# Patient Record
Sex: MALE | URBAN - METROPOLITAN AREA
[De-identification: names, ages, dates, MRNs, and addresses within clinical notes are randomized per-mention and may not be internally consistent; named-entity substitution may affect disease eponyms.]

---

## 2022-09-27 ENCOUNTER — APPOINTMENT (RX ONLY)
Dept: URBAN - METROPOLITAN AREA CLINIC 158 | Facility: CLINIC | Age: 42
Setting detail: DERMATOLOGY
End: 2022-09-27

## 2022-09-27 DIAGNOSIS — L40.0 PSORIASIS VULGARIS: ICD-10-CM

## 2022-09-27 PROCEDURE — ? PRESCRIPTION

## 2022-09-27 PROCEDURE — ? ORDER TESTS

## 2022-09-27 PROCEDURE — ? EDUCATIONAL RESOURCES PROVIDED

## 2022-09-27 PROCEDURE — 99204 OFFICE O/P NEW MOD 45 MIN: CPT

## 2022-09-27 PROCEDURE — ? COUNSELING

## 2022-09-27 PROCEDURE — ? PATIENT SPECIFIC COUNSELING

## 2022-09-27 RX ORDER — CLOBETASOL PROPIONATE 0.5 MG/G
1 CREAM TOPICAL BID
Qty: 45 | Refills: 6 | Status: ERX | COMMUNITY
Start: 2022-09-27

## 2022-09-27 RX ADMIN — CLOBETASOL PROPIONATE 1: 0.5 CREAM TOPICAL at 00:00

## 2022-09-27 ASSESSMENT — LOCATION SIMPLE DESCRIPTION DERM
LOCATION SIMPLE: LEFT KNEE
LOCATION SIMPLE: RIGHT PRETIBIAL REGION
LOCATION SIMPLE: RIGHT FOREARM
LOCATION SIMPLE: RIGHT KNEE
LOCATION SIMPLE: LEFT PRETIBIAL REGION
LOCATION SIMPLE: LEFT FOREARM

## 2022-09-27 ASSESSMENT — LOCATION DETAILED DESCRIPTION DERM
LOCATION DETAILED: RIGHT PROXIMAL DORSAL FOREARM
LOCATION DETAILED: LEFT KNEE
LOCATION DETAILED: RIGHT LATERAL DISTAL PRETIBIAL REGION
LOCATION DETAILED: LEFT DISTAL PRETIBIAL REGION
LOCATION DETAILED: LEFT PROXIMAL DORSAL FOREARM
LOCATION DETAILED: RIGHT KNEE

## 2022-09-27 ASSESSMENT — LOCATION ZONE DERM
LOCATION ZONE: ARM
LOCATION ZONE: LEG

## 2022-09-27 ASSESSMENT — BSA PSORIASIS: % BODY COVERED IN PSORIASIS: 15

## 2022-09-27 NOTE — PROCEDURE: PATIENT SPECIFIC COUNSELING
The patient has a long-standing history of psoriasis.  He reports that he was treated with Enbrel 50mg weekly for 10 years while he was living in [place]. He reports that Enbel did control his psoriasis (but did not completely clear him). Prior to being on Enbrel, he had plaques of psoriasis over the scalp, trunk, arms, and legs.  He has been off Enbrel for 2 years ever since he moved here to [place].  He reports that his psoriasis is flaring over the past year without Enbrel. He is currently using a prescription cream (he cannot recall the name of the medication) but it has not helped. Exam shows active psoriasis plaques on the arms and legs. There is approximately 15% BSA involved.  Patient wants to go back on a biologic. I discussed treatment options with the biologic and recommend that we try to get him approve for Skyrizi as this medication has better efficacy than Enbrel and is only dosed 4 times a year after the initial loading dose. I will submit Skyrizi complete enrollment form to [facility] specialty pharmacy and to Skyrizi complete. I will start him on clobetasol cream topically. Printed order given for blood test.  Return to clinic TBD based upon approval of Skyrizi.
Detail Level: Detailed

## 2022-10-31 ENCOUNTER — APPOINTMENT (RX ONLY)
Dept: URBAN - METROPOLITAN AREA CLINIC 158 | Facility: CLINIC | Age: 42
Setting detail: DERMATOLOGY
End: 2022-10-31

## 2022-10-31 DIAGNOSIS — L40.0 PSORIASIS VULGARIS: ICD-10-CM | Status: INADEQUATELY CONTROLLED

## 2022-10-31 PROCEDURE — ? COUNSELING

## 2022-10-31 PROCEDURE — ? EDUCATIONAL RESOURCES PROVIDED

## 2022-10-31 PROCEDURE — 99214 OFFICE O/P EST MOD 30 MIN: CPT

## 2022-10-31 PROCEDURE — ? PRESCRIPTION

## 2022-10-31 PROCEDURE — ? ORDER TESTS

## 2022-10-31 PROCEDURE — ? PATIENT SPECIFIC COUNSELING

## 2022-10-31 RX ORDER — METHOTREXATE SODIUM 2.5 MG/1
4 TABLET ORAL WEEKLY
Qty: 16 | Refills: 0 | Status: ERX | COMMUNITY
Start: 2022-10-31

## 2022-10-31 RX ORDER — FOLIC ACID 1 MG/1
1 TABLET ORAL QD
Qty: 30 | Refills: 3 | Status: ERX | COMMUNITY
Start: 2022-10-31

## 2022-10-31 RX ADMIN — FOLIC ACID 1: 1 TABLET ORAL at 00:00

## 2022-10-31 RX ADMIN — METHOTREXATE SODIUM 4: 2.5 TABLET ORAL at 00:00

## 2022-10-31 ASSESSMENT — LOCATION SIMPLE DESCRIPTION DERM
LOCATION SIMPLE: LEFT FOREARM
LOCATION SIMPLE: RIGHT FOREARM
LOCATION SIMPLE: RIGHT UPPER ARM
LOCATION SIMPLE: RIGHT PRETIBIAL REGION
LOCATION SIMPLE: RIGHT KNEE
LOCATION SIMPLE: LEFT PRETIBIAL REGION
LOCATION SIMPLE: LEFT KNEE
LOCATION SIMPLE: LEFT UPPER ARM

## 2022-10-31 ASSESSMENT — BSA PSORIASIS: % BODY COVERED IN PSORIASIS: 15

## 2022-10-31 ASSESSMENT — LOCATION DETAILED DESCRIPTION DERM
LOCATION DETAILED: RIGHT PROXIMAL LATERAL POSTERIOR UPPER ARM
LOCATION DETAILED: LEFT KNEE
LOCATION DETAILED: RIGHT KNEE
LOCATION DETAILED: LEFT PROXIMAL DORSAL FOREARM
LOCATION DETAILED: LEFT PROXIMAL POSTERIOR UPPER ARM
LOCATION DETAILED: LEFT DISTAL PRETIBIAL REGION
LOCATION DETAILED: RIGHT PROXIMAL DORSAL FOREARM
LOCATION DETAILED: RIGHT LATERAL DISTAL PRETIBIAL REGION

## 2022-10-31 ASSESSMENT — LOCATION ZONE DERM
LOCATION ZONE: LEG
LOCATION ZONE: ARM

## 2022-10-31 ASSESSMENT — PGA PSORIASIS: PGA PSORIASIS 2020: MODERATE

## 2022-10-31 NOTE — PROCEDURE: PATIENT SPECIFIC COUNSELING
Carry forward from 9/27/2022: The patient has a long-standing history of psoriasis.  He reports that he was treated with Enbrel 50mg weekly for 10 years while he was living in [place]. He reports that Enbel did control his psoriasis (but did not completely clear him). Prior to being on Enbrel, he had plaques of psoriasis over the scalp, trunk, arms, and legs.  He has been off Enbrel for 2 years ever since he moved here to [place].  He reports that his psoriasis is flaring over the past year without Enbrel. He is currently using a prescription cream (he cannot recall the name of the medication) but it has not helped. Exam shows active psoriasis plaques on the arms and legs. There is approximately 15% BSA involved.  Patient wants to go back on a biologic. I discussed treatment options with the biologic and recommend that we try to get him approve for Skyrizi as this medication has better efficacy than Enbrel and is only dosed 4 times a year after the initial loading dose. I will submit Skyrizi complete enrollment form to [facility] specialty pharmacy and to Skyrizi complete. I will start him on clobetasol cream topically. Printed order given for blood test.  Return to clinic TBD based upon approval of Skyrizi.\\n\\nTKATHI's note (Oct 31 2022): Prior authorization attempts were made for both Skyrizi and Humira after the last visit and PAs were denied by [employer].  Denial letter states that the patient needs to fail 3 months of methotrexate first before they would cover a biologic such as Humira or Enbrel.  I discussed the use of methotrexate with the patient including the medication's side effects.  The exam still shows moderate psoriasis over the arms and legs. He has about 15% BSA involvement.  I will start him on methotrexate 10mg weekly and folic acid 1 mg QD.  He can continue the use of clobetasol cream topically.  Lab drawn on 10/6/2022 showed unremarkable CBC, CMP, negative QuantiFERON-TB Gold Plus, and negative hepatitis panel.  I will have the patient get CBC and CMP again 1 week prior to follow-up appointment in 1 month.
Detail Level: Detailed

## 2022-11-28 ENCOUNTER — APPOINTMENT (RX ONLY)
Dept: URBAN - METROPOLITAN AREA CLINIC 158 | Facility: CLINIC | Age: 42
Setting detail: DERMATOLOGY
End: 2022-11-28

## 2022-11-28 DIAGNOSIS — L40.0 PSORIASIS VULGARIS: ICD-10-CM

## 2022-11-28 PROCEDURE — ? PRESCRIPTION

## 2022-11-28 PROCEDURE — ? ORDER TESTS

## 2022-11-28 PROCEDURE — 99214 OFFICE O/P EST MOD 30 MIN: CPT

## 2022-11-28 PROCEDURE — ? COUNSELING

## 2022-11-28 PROCEDURE — ? PATIENT SPECIFIC COUNSELING

## 2022-11-28 PROCEDURE — ? LAB REPORTS REVIEWED

## 2022-11-28 RX ORDER — METHOTREXATE SODIUM 2.5 MG/1
4 TABLET ORAL WEEKLY
Qty: 24 | Refills: 2 | Status: ERX

## 2022-11-28 ASSESSMENT — LOCATION SIMPLE DESCRIPTION DERM
LOCATION SIMPLE: RIGHT PRETIBIAL REGION
LOCATION SIMPLE: RIGHT KNEE
LOCATION SIMPLE: RIGHT FOREARM
LOCATION SIMPLE: RIGHT UPPER ARM
LOCATION SIMPLE: LEFT KNEE
LOCATION SIMPLE: LEFT PRETIBIAL REGION
LOCATION SIMPLE: LEFT FOREARM
LOCATION SIMPLE: LEFT UPPER ARM

## 2022-11-28 ASSESSMENT — LOCATION DETAILED DESCRIPTION DERM
LOCATION DETAILED: RIGHT LATERAL DISTAL PRETIBIAL REGION
LOCATION DETAILED: LEFT DISTAL PRETIBIAL REGION
LOCATION DETAILED: LEFT PROXIMAL DORSAL FOREARM
LOCATION DETAILED: RIGHT PROXIMAL DORSAL FOREARM
LOCATION DETAILED: RIGHT KNEE
LOCATION DETAILED: RIGHT PROXIMAL LATERAL POSTERIOR UPPER ARM
LOCATION DETAILED: LEFT PROXIMAL POSTERIOR UPPER ARM
LOCATION DETAILED: LEFT KNEE

## 2022-11-28 ASSESSMENT — LOCATION ZONE DERM
LOCATION ZONE: LEG
LOCATION ZONE: ARM

## 2022-11-28 NOTE — PROCEDURE: PATIENT SPECIFIC COUNSELING
Carry forward from 9/27/2022: The patient has a long-standing history of psoriasis.  He reports that he was treated with Enbrel 50mg weekly for 10 years while he was living in [place]. He reports that Enbel did control his psoriasis (but did not completely clear him). Prior to being on Enbrel, he had plaques of psoriasis over the scalp, trunk, arms, and legs.  He has been off Enbrel for 2 years ever since he moved here to [place].  He reports that his psoriasis is flaring over the past year without Enbrel. He is currently using a prescription cream (he cannot recall the name of the medication) but it has not helped. Exam shows active psoriasis plaques on the arms and legs. There is approximately 15% BSA involved.  Patient wants to go back on a biologic. I discussed treatment options with the biologic and recommend that we try to get him approve for Skyrizi as this medication has better efficacy than Enbrel and is only dosed 4 times a year after the initial loading dose. I will submit Skyrizi complete enrollment form to [facility] specialty pharmacy and to Skyrizi complete. I will start him on clobetasol cream topically. Printed order given for blood test.  Return to clinic TBD based upon approval of Skyrizi.\\n\\nCarry forward from (Oct 31 2022): Prior authorization attempts were made for both Skyrizi and Humira after the last visit and PAs were denied by [employer].  Denial letter states that the patient needs to fail 3 months of methotrexate first before they would cover a biologic such as Humira or Enbrel.  I discussed the use of methotrexate with the patient including the medication's side effects.  The exam still shows moderate psoriasis over the arms and legs. He has about 15% BSA involvement.  I will start him on methotrexate 10mg weekly and folic acid 1 mg QD.  He can continue the use of clobetasol cream topically.  Lab drawn on 10/6/2022 showed unremarkable CBC, CMP, negative QuantiFERON-TB Gold Plus, and negative hepatitis panel.  I will have the patient get CBC and CMP again 1 week prior to follow-up appointment in 1 month.\\n\\nTODAY's note (Nov 28 2022): The patient has been on methotrexate 10mg weekly for 1 month. There is only a modest improvement; he still has active plaques of psoriasis on the arms and legs. He still has 15% BSA involvement.  I will have him increase methotrexate to 15mg weekly and stay on folic acid 1 mg QD.  He can continue to use clobetasol cream topically. CBC and CMP drawn on 11/21/2022 were unremarkable. I will have him get CBC and CMP again in 1 month. Return to clinic in 2 months.
Detail Level: Detailed

## 2023-01-30 ENCOUNTER — APPOINTMENT (RX ONLY)
Dept: URBAN - METROPOLITAN AREA CLINIC 158 | Facility: CLINIC | Age: 43
Setting detail: DERMATOLOGY
End: 2023-01-30

## 2023-01-30 DIAGNOSIS — L40.0 PSORIASIS VULGARIS: ICD-10-CM

## 2023-01-30 PROCEDURE — ? PATIENT SPECIFIC COUNSELING

## 2023-01-30 PROCEDURE — 99214 OFFICE O/P EST MOD 30 MIN: CPT

## 2023-01-30 PROCEDURE — ? COUNSELING

## 2023-01-30 PROCEDURE — ? PRESCRIPTION

## 2023-01-30 PROCEDURE — ? LAB REPORTS REVIEWED

## 2023-01-30 RX ORDER — METHOTREXATE SODIUM 2.5 MG/1
6 TABLET ORAL WEEKLY
Qty: 24 | Refills: 0 | Status: ERX

## 2023-01-30 ASSESSMENT — LOCATION DETAILED DESCRIPTION DERM
LOCATION DETAILED: RIGHT PROXIMAL LATERAL POSTERIOR UPPER ARM
LOCATION DETAILED: RIGHT PROXIMAL DORSAL FOREARM
LOCATION DETAILED: LEFT KNEE
LOCATION DETAILED: LEFT PROXIMAL POSTERIOR UPPER ARM
LOCATION DETAILED: RIGHT KNEE
LOCATION DETAILED: RIGHT LATERAL DISTAL PRETIBIAL REGION
LOCATION DETAILED: LEFT PROXIMAL DORSAL FOREARM
LOCATION DETAILED: LEFT DISTAL PRETIBIAL REGION

## 2023-01-30 ASSESSMENT — LOCATION SIMPLE DESCRIPTION DERM
LOCATION SIMPLE: RIGHT PRETIBIAL REGION
LOCATION SIMPLE: LEFT KNEE
LOCATION SIMPLE: LEFT PRETIBIAL REGION
LOCATION SIMPLE: LEFT UPPER ARM
LOCATION SIMPLE: RIGHT FOREARM
LOCATION SIMPLE: RIGHT UPPER ARM
LOCATION SIMPLE: LEFT FOREARM
LOCATION SIMPLE: RIGHT KNEE

## 2023-01-30 ASSESSMENT — LOCATION ZONE DERM
LOCATION ZONE: ARM
LOCATION ZONE: LEG

## 2023-01-30 NOTE — PROCEDURE: PATIENT SPECIFIC COUNSELING
Carry forward from 9/27/2022: The patient has a long-standing history of psoriasis.  He reports that he was treated with Enbrel 50mg weekly for 10 years while he was living in [place]. He reports that Enbel did control his psoriasis (but did not completely clear him). Prior to being on Enbrel, he had plaques of psoriasis over the scalp, trunk, arms, and legs.  He has been off Enbrel for 2 years ever since he moved here to [place].  He reports that his psoriasis is flaring over the past year without Enbrel. He is currently using a prescription cream (he cannot recall the name of the medication) but it has not helped. Exam shows active psoriasis plaques on the arms and legs. There is approximately 15% BSA involved.  Patient wants to go back on a biologic. I discussed treatment options with the biologic and recommend that we try to get him approve for Skyrizi as this medication has better efficacy than Enbrel and is only dosed 4 times a year after the initial loading dose. I will submit Skyrizi complete enrollment form to [facility] specialty pharmacy and to Skyrizi complete. I will start him on clobetasol cream topically. Printed order given for blood test.  Return to clinic TBD based upon approval of Skyrizi.\\n\\nCarry forward from (Oct 31 2022): Prior authorization attempts were made for both Skyrizi and Humira after the last visit and PAs were denied by [employer].  Denial letter states that the patient needs to fail 3 months of methotrexate first before they would cover a biologic such as Humira or Enbrel.  I discussed the use of methotrexate with the patient including the medication's side effects.  The exam still shows moderate psoriasis over the arms and legs. He has about 15% BSA involvement.  I will start him on methotrexate 10mg weekly and folic acid 1 mg QD.  He can continue the use of clobetasol cream topically.  Lab drawn on 10/6/2022 showed unremarkable CBC, CMP, negative QuantiFERON-TB Gold Plus, and negative hepatitis panel.  I will have the patient get CBC and CMP again 1 week prior to follow-up appointment in 1 month.\\n\\nCarry forward from (Nov 28 2022): The patient has been on methotrexate 10mg weekly for 1 month. There is only a modest improvement; he still has active plaques of psoriasis on the arms and legs. He still has 15% BSA involvement.  I will have him increase methotrexate to 15mg weekly and stay on folic acid 1 mg QD.  He can continue to use clobetasol cream topically. CBC and CMP drawn on 11/21/2022 were unremarkable. I will have him get CBC and CMP again in 1 month. Return to clinic in 2 months.\\n\\Aleksey's note (Jan 30 2023): Condition has not improved with methotrexate and clobetasol cream.  Exam shows active psoriasis plaques on the arms, hands, legs, and right flank. The body surface area of involvement is 15%.  He reports itching. scaling, and redness as his symptoms.  He has been on methotrexate for 3 months. Now that we have documented failure with 3 months of methotrexate, we can now try to get Humira covered for the patient.  I had tried to prescribe Humira for the patient in October 2022 but his insurance, [employer] [facility] had denied coverage. Per the denial letter from [facility], the patient needed to fail 3 months of methotrexate before Humira would be covered. I will submit a prescription for Humira to [facility] (preferred specialty pharmacy for [facility]) to start the prior authorization process.  I will have him stay on methotrexate 15mg weekly until we can get Humira approved with [employer].  A refill for methotrexate was sent to his local pharmacy on file. The patient reported that he got blood tests last in Dec 2022 at [facility]; will track down CBC and CMP.  He had a negative QuantiFERON-TB Gold Plus on 10/6/2022.  Return to clinic in 3 months assuming that Humira gets approved.
Detail Level: Detailed

## 2023-05-01 ENCOUNTER — APPOINTMENT (RX ONLY)
Dept: URBAN - METROPOLITAN AREA CLINIC 158 | Facility: CLINIC | Age: 43
Setting detail: DERMATOLOGY
End: 2023-05-01

## 2023-05-01 DIAGNOSIS — L40.0 PSORIASIS VULGARIS: ICD-10-CM | Status: WELL CONTROLLED

## 2023-05-01 PROCEDURE — ? HUMIRA MONITORING

## 2023-05-01 PROCEDURE — ? COUNSELING

## 2023-05-01 PROCEDURE — 99213 OFFICE O/P EST LOW 20 MIN: CPT

## 2023-05-01 PROCEDURE — ? PATIENT SPECIFIC COUNSELING

## 2023-05-01 ASSESSMENT — LOCATION SIMPLE DESCRIPTION DERM
LOCATION SIMPLE: LEFT FOREARM
LOCATION SIMPLE: RIGHT FOREARM
LOCATION SIMPLE: RIGHT UPPER ARM
LOCATION SIMPLE: LEFT KNEE
LOCATION SIMPLE: LEFT UPPER ARM
LOCATION SIMPLE: LEFT PRETIBIAL REGION
LOCATION SIMPLE: RIGHT PRETIBIAL REGION
LOCATION SIMPLE: RIGHT KNEE

## 2023-05-01 ASSESSMENT — LOCATION DETAILED DESCRIPTION DERM
LOCATION DETAILED: LEFT DISTAL PRETIBIAL REGION
LOCATION DETAILED: RIGHT LATERAL DISTAL PRETIBIAL REGION
LOCATION DETAILED: LEFT PROXIMAL DORSAL FOREARM
LOCATION DETAILED: RIGHT KNEE
LOCATION DETAILED: LEFT PROXIMAL POSTERIOR UPPER ARM
LOCATION DETAILED: RIGHT PROXIMAL DORSAL FOREARM
LOCATION DETAILED: LEFT KNEE
LOCATION DETAILED: RIGHT PROXIMAL LATERAL POSTERIOR UPPER ARM

## 2023-05-01 ASSESSMENT — LOCATION ZONE DERM
LOCATION ZONE: ARM
LOCATION ZONE: LEG

## 2023-05-01 ASSESSMENT — PGA PSORIASIS: PGA PSORIASIS 2020: ALMOST CLEAR

## 2023-05-01 NOTE — PROCEDURE: PATIENT SPECIFIC COUNSELING
Carry forward from 9/27/2022: The patient has a long-standing history of psoriasis.  He reports that he was treated with Enbrel 50mg weekly for 10 years while he was living in [place]. He reports that Enbel did control his psoriasis (but did not completely clear him). Prior to being on Enbrel, he had plaques of psoriasis over the scalp, trunk, arms, and legs.  He has been off Enbrel for 2 years ever since he moved here to [place].  He reports that his psoriasis is flaring over the past year without Enbrel. He is currently using a prescription cream (he cannot recall the name of the medication) but it has not helped. Exam shows active psoriasis plaques on the arms and legs. There is approximately 15% BSA involved.  Patient wants to go back on a biologic. I discussed treatment options with the biologic and recommend that we try to get him approve for Skyrizi as this medication has better efficacy than Enbrel and is only dosed 4 times a year after the initial loading dose. I will submit Skyrizi complete enrollment form to [facility] specialty pharmacy and to Skyrizi complete. I will start him on clobetasol cream topically. Printed order given for blood test.  Return to clinic TBD based upon approval of Skyrizi.\\n\\nCarry forward from (Oct 31 2022): Prior authorization attempts were made for both Skyrizi and Humira after the last visit and PAs were denied by [employer].  Denial letter states that the patient needs to fail 3 months of methotrexate first before they would cover a biologic such as Humira or Enbrel.  I discussed the use of methotrexate with the patient including the medication's side effects.  The exam still shows moderate psoriasis over the arms and legs. He has about 15% BSA involvement.  I will start him on methotrexate 10mg weekly and folic acid 1 mg QD.  He can continue the use of clobetasol cream topically.  Lab drawn on 10/6/2022 showed unremarkable CBC, CMP, negative QuantiFERON-TB Gold Plus, and negative hepatitis panel.  I will have the patient get CBC and CMP again 1 week prior to follow-up appointment in 1 month.\\n\\nCarry forward from (Nov 28 2022): The patient has been on methotrexate 10mg weekly for 1 month. There is only a modest improvement; he still has active plaques of psoriasis on the arms and legs. He still has 15% BSA involvement.  I will have him increase methotrexate to 15mg weekly and stay on folic acid 1 mg QD.  He can continue to use clobetasol cream topically. CBC and CMP drawn on 11/21/2022 were unremarkable. I will have him get CBC and CMP again in 1 month. Return to clinic in 2 months.\\n\\nCarry forward from (Jan 30 2023): Condition has not improved with methotrexate and clobetasol cream.  Exam shows active psoriasis plaques on the arms, hands, legs, and right flank. The body surface area of involvement is 15%.  He reports itching. scaling, and redness as his symptoms.  He has been on methotrexate for 3 months. Now that we have documented failure with 3 months of methotrexate, we can now try to get Humira covered for the patient.  I had tried to prescribe Humira for the patient in October 2022 but his insurance, [employer] had denied coverage. Per the denial letter from [facility], the patient needed to fail 3 months of methotrexate before Humira would be covered. I will submit a prescription for Humira to [facility] (preferred specialty pharmacy for [facility]) to start the prior authorization process.  I will have him stay on methotrexate 15mg weekly until we can get Humira approved with [facility].  A refill for methotrexate was sent to his local pharmacy on file. The patient reported that he got blood tests last in Dec 2022 at [facility]; will track down CBC and CMP.  He had a negative QuantiFERON-TB Gold Plus on 10/6/2022.  Return to clinic in 3 months assuming that Humira gets approved.\\n\\nTODAY's note (May 01 2023): The patient has been on Humira 40mg SQ every 2 weeks since Feb 2023.   He is getting the medication [facility].  Psoriasis is much improved with decreased plaques, decreased itching, and burning over the arms, legs, and abdomen/flank.  He denies any illnesses with the medication.  I will have him maintain on Humira 40mg every 2 weeks.  Return to clinic in 6 months.
Detail Level: Detailed

## 2023-05-01 NOTE — PROCEDURE: COUNSELING
Detail Level: Simple
Quality 410: Psoriasis Clinical Response To Oral Systemic Or Biologic Mediations: Patient has been on biologic or system therapy for less than 6 months
Quality 176: Tuberculosis Screening Prior To First Course Biologic And/Or Immune Response Modifier Therapy: Patient receiving first-time biologic and/or immune response modifier therapy, TB Screening Performed and Results Interpreted within 12 months

## 2023-11-01 ENCOUNTER — APPOINTMENT (RX ONLY)
Dept: URBAN - METROPOLITAN AREA CLINIC 158 | Facility: CLINIC | Age: 43
Setting detail: DERMATOLOGY
End: 2023-11-01

## 2023-11-01 DIAGNOSIS — L40.0 PSORIASIS VULGARIS: ICD-10-CM | Status: WELL CONTROLLED

## 2023-11-01 PROCEDURE — ? ORDER TESTS

## 2023-11-01 PROCEDURE — 99213 OFFICE O/P EST LOW 20 MIN: CPT

## 2023-11-01 PROCEDURE — ? PATIENT SPECIFIC COUNSELING

## 2023-11-01 PROCEDURE — ? HUMIRA MONITORING

## 2023-11-01 PROCEDURE — ? COUNSELING

## 2023-11-01 ASSESSMENT — LOCATION SIMPLE DESCRIPTION DERM
LOCATION SIMPLE: RIGHT KNEE
LOCATION SIMPLE: RIGHT FOREARM
LOCATION SIMPLE: LEFT UPPER ARM
LOCATION SIMPLE: LEFT KNEE
LOCATION SIMPLE: RIGHT PRETIBIAL REGION
LOCATION SIMPLE: RIGHT UPPER ARM
LOCATION SIMPLE: LEFT PRETIBIAL REGION
LOCATION SIMPLE: LEFT FOREARM

## 2023-11-01 ASSESSMENT — LOCATION DETAILED DESCRIPTION DERM
LOCATION DETAILED: LEFT KNEE
LOCATION DETAILED: LEFT PROXIMAL DORSAL FOREARM
LOCATION DETAILED: RIGHT LATERAL DISTAL PRETIBIAL REGION
LOCATION DETAILED: LEFT DISTAL PRETIBIAL REGION
LOCATION DETAILED: RIGHT PROXIMAL DORSAL FOREARM
LOCATION DETAILED: RIGHT PROXIMAL LATERAL POSTERIOR UPPER ARM
LOCATION DETAILED: LEFT PROXIMAL POSTERIOR UPPER ARM
LOCATION DETAILED: RIGHT KNEE

## 2023-11-01 ASSESSMENT — BSA PSORIASIS: % BODY COVERED IN PSORIASIS: 1

## 2023-11-01 ASSESSMENT — PGA PSORIASIS: PGA PSORIASIS 2020: ALMOST CLEAR

## 2023-11-01 ASSESSMENT — LOCATION ZONE DERM
LOCATION ZONE: LEG
LOCATION ZONE: ARM

## 2023-11-01 NOTE — PROCEDURE: ORDER TESTS
Performing Laboratory: 0
Billing Type: Third-Party Bill
Expected Date Of Service: 11/01/2023
Bill For Surgical Tray: no

## 2023-11-01 NOTE — PROCEDURE: PATIENT SPECIFIC COUNSELING
Carry forward from 9/27/2022: The patient has a long-standing history of psoriasis.  He reports that he was treated with Enbrel 50mg weekly for 10 years while he was living in [place]. He reports that Enbel did control his psoriasis (but did not completely clear him). Prior to being on Enbrel, he had plaques of psoriasis over the scalp, trunk, arms, and legs.  He has been off Enbrel for 2 years ever since he moved here to [place].  He reports that his psoriasis is flaring over the past year without Enbrel. He is currently using a prescription cream (he cannot recall the name of the medication) but it has not helped. Exam shows active psoriasis plaques on the arms and legs. There is approximately 15% BSA involved.  Patient wants to go back on a biologic. I discussed treatment options with the biologic and recommend that we try to get him approve for Skyrizi as this medication has better efficacy than Enbrel and is only dosed 4 times a year after the initial loading dose. I will submit Skyrizi complete enrollment form to [facility] specialty pharmacy and to Skyrizi complete. I will start him on clobetasol cream topically. Printed order given for blood test.  Return to clinic TBD based upon approval of Skyrizi.\\n\\nCarry forward from (Oct 31 2022): Prior authorization attempts were made for both Skyrizi and Humira after the last visit and PAs were denied by [employer].  Denial letter states that the patient needs to fail 3 months of methotrexate first before they would cover a biologic such as Humira or Enbrel.  I discussed the use of methotrexate with the patient including the medication's side effects.  The exam still shows moderate psoriasis over the arms and legs. He has about 15% BSA involvement.  I will start him on methotrexate 10mg weekly and folic acid 1 mg QD.  He can continue the use of clobetasol cream topically.  Lab drawn on 10/6/2022 showed unremarkable CBC, CMP, negative QuantiFERON-TB Gold Plus, and negative hepatitis panel.  I will have the patient get CBC and CMP again 1 week prior to follow-up appointment in 1 month.\\n\\nCarry forward from (Nov 28 2022): The patient has been on methotrexate 10mg weekly for 1 month. There is only a modest improvement; he still has active plaques of psoriasis on the arms and legs. He still has 15% BSA involvement.  I will have him increase methotrexate to 15mg weekly and stay on folic acid 1 mg QD.  He can continue to use clobetasol cream topically. CBC and CMP drawn on 11/21/2022 were unremarkable. I will have him get CBC and CMP again in 1 month. Return to clinic in 2 months.\\n\\nCarry forward from (Jan 30 2023): Condition has not improved with methotrexate and clobetasol cream.  Exam shows active psoriasis plaques on the arms, hands, legs, and right flank. The body surface area of involvement is 15%.  He reports itching. scaling, and redness as his symptoms.  He has been on methotrexate for 3 months. Now that we have documented failure with 3 months of methotrexate, we can now try to get Humira covered for the patient.  I had tried to prescribe Humira for the patient in October 2022 but his insurance, [employer] had denied coverage. Per the denial letter from [facility], the patient needed to fail 3 months of methotrexate before Humira would be covered. I will submit a prescription for Humira to [facility] (preferred specialty pharmacy for [facility]) to start the prior authorization process.  I will have him stay on methotrexate 15mg weekly until we can get Humira approved with [facility].  A refill for methotrexate was sent to his local pharmacy on file. The patient reported that he got blood tests last in Dec 2022 at [facility]; will track down CBC and CMP.  He had a negative QuantiFERON-TB Gold Plus on 10/6/2022.  Return to clinic in 3 months assuming that Humira gets approved.\\n\\nCarry forward from (May 01 2023): The patient has been on Humira 40mg SQ every 2 weeks since Feb 2023.   He is getting the medication [facility].  Psoriasis is much improved with decreased plaques, decreased itching, and burning over the arms, legs, and abdomen/flank.  He denies any illnesses with the medication.  I will have him maintain on Humira 40mg every 2 weeks.  Return to clinic in 6 months.\\n\\nTODAY's note (Nov 01 2023): The patient has been on Humira 40mg SQ every 2 weeks since Feb 2023.   He is getting the medication through [facility].  Psoriasis is under great on control with Humira. He has only a few small psoriasis papules on the arms and legs and no itching or burning.  He denies any illnesses with the medication.  I will have him maintain on Humira 40mg every 2 weeks.  Printed order was given for his annual QuantiFERON-TB Gold Plus. Return to clinic in 6 months.
Detail Level: Detailed

## 2023-11-01 NOTE — PROCEDURE: COUNSELING
Detail Level: Simple
Quality 410: Psoriasis Clinical Response To Oral Systemic Or Biologic Mediations: Psoriasis Assessment Tool Documented, Met Specified Benchmark
Quality 176: Tuberculosis Screening Prior To First Course Of Biologic And/Or Immune Response Modifier Therapy: Patient receiving first-time biologic and/or immune response modifier therapy, TB Screening Performed and Results Interpreted within 12 months

## 2024-01-31 ENCOUNTER — RX ONLY (OUTPATIENT)
Age: 44
Setting detail: RX ONLY
End: 2024-01-31

## 2024-01-31 RX ORDER — ADALIMUMAB 40MG/0.4ML
1 KIT SUBCUTANEOUS EVERY 2 WEEKS
Qty: 1 | Refills: 12 | Status: CANCELLED
Stop reason: CLARIF

## 2024-05-07 ENCOUNTER — APPOINTMENT (RX ONLY)
Dept: URBAN - METROPOLITAN AREA CLINIC 158 | Facility: CLINIC | Age: 44
Setting detail: DERMATOLOGY
End: 2024-05-07

## 2024-05-07 DIAGNOSIS — L40.0 PSORIASIS VULGARIS: ICD-10-CM

## 2024-05-07 PROCEDURE — ? HUMIRA MONITORING

## 2024-05-07 PROCEDURE — 99213 OFFICE O/P EST LOW 20 MIN: CPT

## 2024-05-07 PROCEDURE — ? COUNSELING

## 2024-05-07 PROCEDURE — ? LAB REPORTS REVIEWED

## 2024-05-07 PROCEDURE — ? PATIENT SPECIFIC COUNSELING

## 2024-05-07 PROCEDURE — ? PRESCRIPTION

## 2024-05-07 RX ORDER — CLOBETASOL PROPIONATE 0.5 MG/ML
1 SOLUTION TOPICAL QD
Qty: 50 | Refills: 6 | Status: ERX | COMMUNITY
Start: 2024-05-07

## 2024-05-07 RX ADMIN — CLOBETASOL PROPIONATE 1: 0.5 SOLUTION TOPICAL at 00:00

## 2024-05-07 ASSESSMENT — LOCATION DETAILED DESCRIPTION DERM
LOCATION DETAILED: LEFT KNEE
LOCATION DETAILED: RIGHT KNEE

## 2024-05-07 ASSESSMENT — BSA PSORIASIS: % BODY COVERED IN PSORIASIS: 1

## 2024-05-07 ASSESSMENT — LOCATION ZONE DERM: LOCATION ZONE: LEG

## 2024-05-07 ASSESSMENT — LOCATION SIMPLE DESCRIPTION DERM
LOCATION SIMPLE: RIGHT KNEE
LOCATION SIMPLE: LEFT KNEE

## 2024-05-07 ASSESSMENT — ITCH NUMERIC RATING SCALE: HOW SEVERE IS YOUR ITCHING?: 0

## 2024-05-07 ASSESSMENT — PGA PSORIASIS: PGA PSORIASIS 2020: ALMOST CLEAR

## 2024-05-07 NOTE — PROCEDURE: PATIENT SPECIFIC COUNSELING
Carry forward from 9/27/2022: The patient has a long-standing history of psoriasis.  He reports that he was treated with Enbrel 50mg weekly for 10 years while he was living in [place]. He reports that Enbel did control his psoriasis (but did not completely clear him). Prior to being on Enbrel, he had plaques of psoriasis over the scalp, trunk, arms, and legs.  He has been off Enbrel for 2 years ever since he moved here to [place].  He reports that his psoriasis is flaring over the past year without Enbrel. He is currently using a prescription cream (he cannot recall the name of the medication) but it has not helped. Exam shows active psoriasis plaques on the arms and legs. There is approximately 15% BSA involved.  Patient wants to go back on a biologic. I discussed treatment options with the biologic and recommend that we try to get him approve for Skyrizi as this medication has better efficacy than Enbrel and is only dosed 4 times a year after the initial loading dose. I will submit Skyrizi complete enrollment form to [facility] and to Skyrizi complete. I will start him on clobetasol cream topically. Printed order given for blood test.  Return to clinic TBD based upon approval of Skyrizi.\\n\\nCarry forward from (Oct 31 2022): Prior authorization attempts were made for both Skyrizi and Humira after the last visit and PAs were denied by [employer].  Denial letter states that the patient needs to fail 3 months of methotrexate first before they would cover a biologic such as Humira or Enbrel.  I discussed the use of methotrexate with the patient including the medication's side effects.  The exam still shows moderate psoriasis over the arms and legs. He has about 15% BSA involvement.  I will start him on methotrexate 10mg weekly and folic acid 1 mg QD.  He can continue the use of clobetasol cream topically.  Lab drawn on 10/6/2022 showed unremarkable CBC, CMP, negative QuantiFERON-TB Gold Plus, and negative hepatitis panel.  I will have the patient get CBC and CMP again 1 week prior to follow-up appointment in 1 month.\\n\\nCarry forward from (Nov 28 2022): The patient has been on methotrexate 10mg weekly for 1 month. There is only a modest improvement; he still has active plaques of psoriasis on the arms and legs. He still has 15% BSA involvement.  I will have him increase methotrexate to 15mg weekly and stay on folic acid 1 mg QD.  He can continue to use clobetasol cream topically. CBC and CMP drawn on 11/21/2022 were unremarkable. I will have him get CBC and CMP again in 1 month. Return to clinic in 2 months.\\n\\nCarry forward from (Jan 30 2023): Condition has not improved with methotrexate and clobetasol cream.  Exam shows active psoriasis plaques on the arms, hands, legs, and right flank. The body surface area of involvement is 15%.  He reports itching. scaling, and redness as his symptoms.  He has been on methotrexate for 3 months. Now that we have documented failure with 3 months of methotrexate, we can now try to get Humira covered for the patient.  I had tried to prescribe Humira for the patient in October 2022 but his insurance, [employer] [facility] had denied coverage. Per the denial letter from [employer], the patient needed to fail 3 months of methotrexate before Humira would be covered. I will submit a prescription for Humira to [facility] (preferred specialty pharmacy for [facility]) to start the prior authorization process.  I will have him stay on methotrexate 15mg weekly until we can get Humira approved with [employer].  A refill for methotrexate was sent to his local pharmacy on file. The patient reported that he got blood tests last in Dec 2022 at [facility]; will track down CBC and CMP.  He had a negative QuantiFERON-TB Gold Plus on 10/6/2022.  Return to clinic in 3 months assuming that Humira gets approved.\\n\\nCarry forward from (May 01 2023): The patient has been on Humira 40mg SQ every 2 weeks since Feb 2023.   He is getting the medication [facility].  Psoriasis is much improved with decreased plaques, decreased itching, and burning over the arms, legs, and abdomen/flank.  He denies any illnesses with the medication.  I will have him maintain on Humira 40mg every 2 weeks.  Return to clinic in 6 months.\\n\\nCarry forward from (Nov 01 2023): The patient has been on Humira 40mg SQ every 2 weeks since Feb 2023.   He is getting the medication through [facility].  Psoriasis is under great on control with Humira. He has only a few small psoriasis papules on the arms and legs and no itching or burning.  He denies any illnesses with the medication.  I will have him maintain on Humira 40mg every 2 weeks.  Printed order was given for his annual QuantiFERON-TB Gold Plus. Return to clinic in 6 months.\\n\\nTODATONY's note (May 07 2024):  The patient has been on Humira 40mg SQ every 2 weeks since Feb 2023.   He is getting the medication through [facility] pharmacy.  Humira is approved through 2/18/2025. Psoriasis is under great control with Humira. He has only a few small psoriasis papules on the knees and no itching or burning.  He denies any illnesses with the medication.  I will have him maintain on Humira 40mg every 2 weeks.  The patient requested prescription for topical scalp medication. I will prescribe him clobetasol scalp solution. His last QuantiFERON-TB Gold Plus on 12/5/2023 was non-reactive.  Return to clinic in 6 months.
Detail Level: Detailed

## 2024-08-22 ENCOUNTER — RX ONLY (OUTPATIENT)
Age: 44
Setting detail: RX ONLY
End: 2024-08-22

## 2024-08-22 RX ORDER — ADALIMUMAB-ADBM 40MG/0.4ML
1 KIT SUBCUTANEOUS EVERY 2 WEEKS
Qty: 2 | Refills: 12 | Status: ERX | COMMUNITY
Start: 2024-08-22

## 2025-03-10 ENCOUNTER — RX ONLY (RX ONLY)
Age: 45
End: 2025-03-10

## 2025-03-10 RX ORDER — ADALIMUMAB-ADBM 40MG/0.4ML
1 KIT SUBCUTANEOUS EVERY 2 WEEKS
Qty: 3 | Refills: 12 | Status: ERX

## 2025-03-14 ENCOUNTER — RX ONLY (RX ONLY)
Age: 45
End: 2025-03-14

## 2025-03-14 RX ORDER — ADALIMUMAB-ADBM 40MG/0.4ML
1 KIT SUBCUTANEOUS EVERY 2 WEEKS
Qty: 3 | Refills: 12 | Status: ERX

## 2025-04-04 ENCOUNTER — RX ONLY (RX ONLY)
Age: 45
End: 2025-04-04

## 2025-04-04 RX ORDER — ADALIMUMAB-ADBM 40MG/0.4ML
1 KIT SUBCUTANEOUS EVERY 2 WEEKS
Qty: 3 | Refills: 12 | Status: ERX